# Patient Record
Sex: FEMALE | Race: WHITE | NOT HISPANIC OR LATINO | Employment: PART TIME | URBAN - METROPOLITAN AREA
[De-identification: names, ages, dates, MRNs, and addresses within clinical notes are randomized per-mention and may not be internally consistent; named-entity substitution may affect disease eponyms.]

---

## 2021-07-08 ENCOUNTER — TRANSCRIBE ORDERS (OUTPATIENT)
Dept: PHYSICAL THERAPY | Facility: CLINIC | Age: 31
End: 2021-07-08

## 2021-07-08 DIAGNOSIS — S16.1XXD STRAIN OF NECK MUSCLE, SUBSEQUENT ENCOUNTER: ICD-10-CM

## 2021-07-08 DIAGNOSIS — S39.012S LUMBAR SPINE STRAIN, SEQUELA: ICD-10-CM

## 2021-07-08 DIAGNOSIS — S22.050S COMPRESSION FRACTURE OF T5 VERTEBRA, SEQUELA: Primary | ICD-10-CM

## 2021-07-13 ENCOUNTER — TREATMENT (OUTPATIENT)
Dept: PHYSICAL THERAPY | Facility: CLINIC | Age: 31
End: 2021-07-13

## 2021-07-13 DIAGNOSIS — S16.1XXS STRAIN OF NECK MUSCLE, SEQUELA: ICD-10-CM

## 2021-07-13 DIAGNOSIS — S39.012S STRAIN OF LUMBAR REGION, SEQUELA: ICD-10-CM

## 2021-07-13 DIAGNOSIS — S22.050S COMPRESSION FRACTURE OF T5 VERTEBRA, SEQUELA: Primary | ICD-10-CM

## 2021-07-13 PROCEDURE — 97110 THERAPEUTIC EXERCISES: CPT | Performed by: PHYSICAL THERAPIST

## 2021-07-13 PROCEDURE — 97161 PT EVAL LOW COMPLEX 20 MIN: CPT | Performed by: PHYSICAL THERAPIST

## 2021-07-13 PROCEDURE — 97014 ELECTRIC STIMULATION THERAPY: CPT | Performed by: PHYSICAL THERAPIST

## 2021-07-13 PROCEDURE — 97140 MANUAL THERAPY 1/> REGIONS: CPT | Performed by: PHYSICAL THERAPIST

## 2021-07-13 NOTE — PROGRESS NOTES
Physical Therapy Initial Evaluation and Plan of Care    Patient: Daya Pinon   : 1990  Diagnosis/ICD-10 Code:  Compression fracture of T5 vertebra, sequela [S22.050S]  Referring practitioner: JENNY Genao  Date of Initial Evaluation:  Type: THERAPY  Noted: 2021  _______________________________________________________________________________________________________________________    Subjective Evaluation    History of Present Illness  Date of onset: 2021  Mechanism of injury: She was loading the truck up.  Meanwhile her boss was working on a sink and left water on the ground.  She slipped and fell onto the bumper more on the left side. Then following this (unsure of length of time in between falls) she slipped on a water hose, falling backward and caught herself with her left arm behind her and hit the ground. She went to Urgent Care that day and was diagnosed with compression fractures.  She is concerned that there may be an SI involvement.  She has a great deal of muscle spasm up to the neck.  Pain and spasm are the worst with sitting.  By the end of the day she is painful and tries to stretch her out.  Takes Flexoril 3 x day and Naproxen 500mg 2 x day.  Wakes her at night 1-2 at night.   Occasionally her left fingers will be numb.     She lifts up to 80#, generators, coolers of ice.     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   Precautions and Work Restrictions: Off workPain  Current pain ratin  At best pain ratin  At worst pain ratin  Location: Neck, back  Quality: dull ache, throbbing, discomfort, squeezing and tight (mostly  throbbing)  Relieving factors: medications, change in position, heat and ice (biofreeze patches, lying on right side with pillow under head and between knees)  Aggravating factors: sleeping, overhead activity, stairs, standing, movement, lifting, outstretched reach,  repetitive movement, prolonged positioning and ambulation (sitting)  Progression: no change (plateaued)    Social Support  Lives in: one-story house (only two steps in out of house.)  Lives with: lives with someone unsupportive.    Hand dominance: left    Diagnostic Tests  X-ray: abnormal  CT scan: abnormal    Treatments  Previous treatment: medication  Current treatment: medication  Patient Goals  Patient goals for therapy: decreased pain, improved balance, increased motion, increased strength, independence with ADLs/IADLs and return to work  Patient goal: bicycle riding           Objective          Static Posture     Head  Forward.    Cervical Spine  Tilted left.    Shoulders  Rounded.    Thoracic Spine  Hyperkyphosis.    Lumbar Spine   Decreased lordosis.     Palpation   Left   Hypertonic in the erector spinae, cervical paraspinals, levator scapulae, lumbar paraspinals, quadratus lumborum, scalenes and upper trapezius.   Tenderness of the erector spinae, levator scapulae, lumbar paraspinals, quadratus lumborum, sternocleidomastoid, suboccipitals and upper trapezius.     Right   Hypertonic in the erector spinae, cervical paraspinals, levator scapulae, lumbar paraspinals, quadratus lumborum, scalenes and upper trapezius. Tenderness of the erector spinae, lumbar paraspinals, suboccipitals and upper trapezius.     Active Range of Motion     Additional Active Range of Motion Details  Cervical AROM %  Cervical Flexion 100%  Cervical Extension 25% painful  Cervical Right Lateral Flexion 25% tight  Cervical Left Lateral Flexion 25% painful, thigh  Cervical Right Rotation 75%  Cervical Left Rotation 75%  Lumbar AROM %  Flexion 75%  Extension 75%  Right Lateral Flexion 50% pulling on left  Left Lateral Flexion 50% pain on left      Strength/Myotome Testing     Left Shoulder     Planes of Motion   Flexion: 4   Extension: 4   Abduction: 4-   External rotation at 0°: 4   Internal rotation at 0°: 4     Right Shoulder      Planes of Motion   Flexion: 4   Extension: 4   Abduction: 4-   External rotation at 0°: 4   Internal rotation at 0°: 4     Left Elbow   Flexion: 4  Extension: 4    Right Elbow   Flexion: 4  Extension: 4    Left Hip   Planes of Motion   Flexion: 4-  Extension: 4-  Abduction: 4-    Right Hip   Planes of Motion   Flexion: 4  Extension: 4  Abduction: 4    Left Knee   Flexion: 4+  Extension: 4+    Right Knee   Flexion: 4+  Extension: 4+    Left Ankle/Foot   Dorsiflexion: 4+    Right Ankle/Foot   Dorsiflexion: 4+    Tests     Lumbar     Left   Negative passive SLR.     Right   Negative passive SLR.     Left Pelvic Girdle/Sacrum   Positive: sacrum compression.     Additional Tests Details  Positive for left upslip  Positive standing flexion test  Negative sitting flexion      Subjective Questionnaire: Oswestry: 23 or 46% disability        Assessment & Plan     Assessment  Impairments: abnormal muscle tone, abnormal or restricted ROM, activity intolerance, impaired physical strength, lacks appropriate home exercise program and pain with function  Assessment details: Daya Pinon is a 31 y.o. year-old female referred to physical therapy for compression fracture of T5 and T6, cervical and lumbar strain.. She presents with a evolving clinical presentation and is currently unable to work her current job.   She represents with positive signs/sypmtoms of SI dysfunction, moderate muscular spasm, decreased cervical and lumbar strain and decreased strength.    Prognosis: good  Functional Limitations: carrying objects, lifting, sleeping, walking, pulling, pushing, uncomfortable because of pain, sitting, standing, stooping, reaching overhead and unable to perform repetitive tasks  Goals  Plan Goals: STG ( 2 weeks)   1.  Pt will be independent with initial HEP for improved mobility of cervical and lumbar spine with decreased pain.   2.  Pt will demonstrate WNL cervical ROM with little to no pain.  3.  Pt will report decreased pain  at the worst from 7/10 to 4/10.  4.  Pt will display level SI landmarks and negative SI tests.    LTG (4 weeks)  1.  Pt will be independent with cervical and lumbosacral stabilization exercises for ease with RTW duties.   2.   Pt will demonstrate WNL lumbar ROM with little to no pain.  3.  Pt will be able to lift 50# from floor to waist with little discomfort.  4.  Pt will be able to perform overhead tasks with ease.   5.  Pt will return to work.     Plan  Therapy options: will be seen for skilled physical therapy services  Planned modality interventions: cryotherapy, electrical stimulation/Russian stimulation and thermotherapy (hydrocollator packs)  Planned therapy interventions: abdominal trunk stabilization, body mechanics training, functional ROM exercises, home exercise program, neuromuscular re-education, postural training, strengthening, stretching, therapeutic activities, flexibility, manual therapy and soft tissue mobilization  Duration in visits: 12  Treatment plan discussed with: patient  Plan details: Assess SI landmarks and tests - treat as identified; Progress in UE/LE trunk flexibility, begin light strengthening, modalities PRN for pain and muscle spasm.           Manual Therapy:    15     mins  36372;  Therapeutic Exercise:    15     mins  79870;     Neuromuscular Shira:        mins  35874;    Therapeutic Activity:          mins  88915;     Gait Training:           mins  14657;     Ultrasound:          mins  72743;    Work Hardening                 mins 71996  Iontophoresis                  mins 75601  Estim   15 min    Timed Treatment:   30   mins   Total Treatment:     60   mins    PT SIGNATURE: Celeste Hoskins PT           Initial Certification  Certification Period: 7/13/2021 thru 10/11/2021  I certify that the therapy services are furnished while this patient is under my care.  The services outlined above are required by this patient, and will be reviewed every 90 days.     PHYSICIAN: Aguilar  JENNY Marcelo      DATE:     Please sign and return via fax to [phone].. Thank you, [facility].

## 2021-07-15 ENCOUNTER — TREATMENT (OUTPATIENT)
Dept: PHYSICAL THERAPY | Facility: CLINIC | Age: 31
End: 2021-07-15

## 2021-07-15 DIAGNOSIS — S39.012S STRAIN OF LUMBAR REGION, SEQUELA: ICD-10-CM

## 2021-07-15 DIAGNOSIS — S22.050S COMPRESSION FRACTURE OF T5 VERTEBRA, SEQUELA: Primary | ICD-10-CM

## 2021-07-15 DIAGNOSIS — S16.1XXS STRAIN OF NECK MUSCLE, SEQUELA: ICD-10-CM

## 2021-07-15 PROCEDURE — 97014 ELECTRIC STIMULATION THERAPY: CPT | Performed by: PHYSICAL THERAPIST

## 2021-07-15 PROCEDURE — 97110 THERAPEUTIC EXERCISES: CPT | Performed by: PHYSICAL THERAPIST

## 2021-07-15 PROCEDURE — 97140 MANUAL THERAPY 1/> REGIONS: CPT | Performed by: PHYSICAL THERAPIST

## 2021-07-15 NOTE — PROGRESS NOTES
Physical Therapy Daily Treatment Note    Patient: Daya Pinon   : 1990  Diagnosis/ICD-10 Code:  Compression fracture of T5 vertebra, sequela [S22.050S]  Referring practitioner: JENNY Genao  Date of Initial Evaluation:  Type: THERAPY  Noted: 2021  Visit # 2      Subjective Evaluation    History of Present Illness    Subjective comment: Overall pain is a 4/10.  Feels like she has more ROM and is walking with less compensation. The neck is tight, especially on the left. Still with occasional numbness in finger tips. Pain  Current pain ratin           Objective   See Exercise, Manual, and Modality Logs for complete treatment.       Assessment & Plan     Assessment  Assessment details: Good response to manual techniques.  She is walking more smoothly and with less pain.  Addressed the cervical spine more closely today.  She is tight in the suboccipital region and hypomobile at C1, C2, C3 with pain.  Response to today's treatment was even better than at initial evaluation.  She reported at end of treatment she felt she was walking normally and no radicular symptoms in her legs. Neck was much less tight.                      Manual Therapy:    30     mins  41059;  Therapeutic Exercise:    15     mins  36004;     Neuromuscular Shira:        mins  85144;    Therapeutic Activity:          mins  52717;     Gait Training:           mins  57724;     Ultrasound:          mins  46463;    Work Hardening                 mins 83448  Iontophoresis                  mins 26356  Estim   15 min    Timed Treatment:   45   mins   Total Treatment:     60   mins    Celeste Hoskins, PT  Physical Therapist

## 2021-07-20 ENCOUNTER — TREATMENT (OUTPATIENT)
Dept: PHYSICAL THERAPY | Facility: CLINIC | Age: 31
End: 2021-07-20

## 2021-07-20 DIAGNOSIS — S16.1XXS STRAIN OF NECK MUSCLE, SEQUELA: ICD-10-CM

## 2021-07-20 DIAGNOSIS — S39.012S STRAIN OF LUMBAR REGION, SEQUELA: ICD-10-CM

## 2021-07-20 DIAGNOSIS — S22.050S COMPRESSION FRACTURE OF T5 VERTEBRA, SEQUELA: Primary | ICD-10-CM

## 2021-07-20 PROCEDURE — 97014 ELECTRIC STIMULATION THERAPY: CPT | Performed by: PHYSICAL THERAPIST

## 2021-07-20 PROCEDURE — 97110 THERAPEUTIC EXERCISES: CPT | Performed by: PHYSICAL THERAPIST

## 2021-07-20 NOTE — PROGRESS NOTES
Physical Therapy Daily Treatment Note    Patient: Daya Pinon   : 1990  Diagnosis/ICD-10 Code:  Compression fracture of T5 vertebra, sequela [S22.050S]  Referring practitioner: JENNY Genao  Date of Initial Evaluation:  Type: THERAPY  Noted: 2021  Visit # 3      Subjective Evaluation    History of Present Illness    Subjective comment: Having some throbbing warm sensation in the left groin.  Some tingling in left buttock with full trunk flexionPain  Current pain rating: 3           Objective   See Exercise, Manual, and Modality Logs for complete treatment.       Assessment & Plan     Assessment  Assessment details: Good response to iliopsoas and piriformis stretching.  She had a good decrease in pain by at least 50% and her gait was normalized at end of treatment. She is continuing to have discomfort at site of compression fracture but this is also decreasing.                       Manual Therapy:         mins  63669;  Therapeutic Exercise:    30     mins  47313;     Neuromuscular Shira:        mins  36379;    Therapeutic Activity:          mins  06816;     Gait Training:           mins  48401;     Ultrasound:          mins  30156;    Work Hardening                 mins 72501  Iontophoresis                  mins 57689  Estim   15 min    Timed Treatment:   30   mins   Total Treatment:     45   mins    Celeste Hoskins, PT  Physical Therapist

## 2021-07-22 ENCOUNTER — TREATMENT (OUTPATIENT)
Dept: PHYSICAL THERAPY | Facility: CLINIC | Age: 31
End: 2021-07-22

## 2021-07-22 DIAGNOSIS — S39.012S STRAIN OF LUMBAR REGION, SEQUELA: ICD-10-CM

## 2021-07-22 DIAGNOSIS — S16.1XXS STRAIN OF NECK MUSCLE, SEQUELA: ICD-10-CM

## 2021-07-22 DIAGNOSIS — S22.050S COMPRESSION FRACTURE OF T5 VERTEBRA, SEQUELA: Primary | ICD-10-CM

## 2021-07-22 PROCEDURE — 97140 MANUAL THERAPY 1/> REGIONS: CPT | Performed by: PHYSICAL THERAPIST

## 2021-07-22 PROCEDURE — 97110 THERAPEUTIC EXERCISES: CPT | Performed by: PHYSICAL THERAPIST

## 2021-07-22 NOTE — PROGRESS NOTES
Physical Therapy Daily Treatment Note    Patient: Daya Pinon   : 1990  Diagnosis/ICD-10 Code:  Compression fracture of T5 vertebra, sequela [S22.050S]  Referring practitioner: JENNY Genao  Date of Initial Evaluation:  Type: THERAPY  Noted: 2021  Visit # 4      Subjective Evaluation    History of Present Illness    Subjective comment: Her dog got one of her shirts and pulled it under her bed. While she was bent over to retrive her shirt from her dog, she felt a pull in her back.  She is feeling more compression in her left lateral side. Pain  Current pain ratin           Objective          Tests     Additional Tests Details  Elevated left ilium in standing, sitting and supine; superior left ischial tuberosity; positive standing and to a greater degree sitting flexion test on left.       See Exercise, Manual, and Modality Logs for complete treatment.       Assessment & Plan     Assessment  Assessment details: She presented with upslip, left sacral torsion and left posterior rotation of lumbar vertebra.  She responded well to manual techniques and progression in exercises.  Her pain was 50% less and she was able to ambulate with greater ease.                      Manual Therapy:    25     mins  25253;  Therapeutic Exercise:    20     mins  47952;     Neuromuscular Shira:        mins  40333;    Therapeutic Activity:          mins  03776;     Gait Training:           mins  83030;     Ultrasound:          mins  94386;    Work Hardening                 mins 62392  Iontophoresis                  mins 04830  Estim   15 min    Timed Treatment:   45   mins   Total Treatment:     60   mins    Celeste Hoskins, YEVGENIY  Physical Therapist

## 2021-07-27 ENCOUNTER — TREATMENT (OUTPATIENT)
Dept: PHYSICAL THERAPY | Facility: CLINIC | Age: 31
End: 2021-07-27

## 2021-07-27 DIAGNOSIS — S16.1XXS STRAIN OF NECK MUSCLE, SEQUELA: ICD-10-CM

## 2021-07-27 DIAGNOSIS — S39.012S STRAIN OF LUMBAR REGION, SEQUELA: ICD-10-CM

## 2021-07-27 DIAGNOSIS — S22.050S COMPRESSION FRACTURE OF T5 VERTEBRA, SEQUELA: Primary | ICD-10-CM

## 2021-07-27 PROCEDURE — 97140 MANUAL THERAPY 1/> REGIONS: CPT | Performed by: PHYSICAL THERAPIST

## 2021-07-27 PROCEDURE — 97014 ELECTRIC STIMULATION THERAPY: CPT | Performed by: PHYSICAL THERAPIST

## 2021-07-27 PROCEDURE — 97110 THERAPEUTIC EXERCISES: CPT | Performed by: PHYSICAL THERAPIST

## 2021-07-27 NOTE — PROGRESS NOTES
Physical Therapy Daily Treatment Note    Patient: Daya Pinon   : 1990  Diagnosis/ICD-10 Code:  Compression fracture of T5 vertebra, sequela [S22.050S]  Referring practitioner: JENNY Genao  Date of Initial Evaluation:  Type: THERAPY  Noted: 2021  Visit # 5      Subjective Evaluation    History of Present Illness    Subjective comment: Feeling better.  The cristina time she notices pain is with prolonged istting and driving with her left hand.  Her pain was in the injur level.Pain  Current pain rating: 3           Objective          Tests     Additional Tests Details  Positive sitting flexion on left, deep left sacral sulcus, deep KATARZYNA on left.      See Exercise, Manual, and Modality Logs for complete treatment.       Assessment & Plan     Assessment  Assessment details: She is responding very well to SI MET and stabilization.  Increased focus on soft tissue around compression fractures to relieve pain and improve mobility.  After each session she is demonstrating improvement.                      Manual Therapy:    25     mins  44777;  Therapeutic Exercise:    20     mins  43370;     Neuromuscular Shira:        mins  60889;    Therapeutic Activity:          mins  13213;     Gait Training:           mins  77762;     Ultrasound:          mins  16903;    Work Hardening                 mins 12737  Iontophoresis                  mins 49624  Estim   15 min    Timed Treatment:   45   mins   Total Treatment:     60   mins    Celeste Hoskins, PT  Physical Therapist

## 2021-07-29 ENCOUNTER — TREATMENT (OUTPATIENT)
Dept: PHYSICAL THERAPY | Facility: CLINIC | Age: 31
End: 2021-07-29

## 2021-07-29 DIAGNOSIS — S39.012S STRAIN OF LUMBAR REGION, SEQUELA: ICD-10-CM

## 2021-07-29 DIAGNOSIS — S22.050S COMPRESSION FRACTURE OF T5 VERTEBRA, SEQUELA: Primary | ICD-10-CM

## 2021-07-29 DIAGNOSIS — S16.1XXS STRAIN OF NECK MUSCLE, SEQUELA: ICD-10-CM

## 2021-07-29 PROCEDURE — 97014 ELECTRIC STIMULATION THERAPY: CPT | Performed by: PHYSICAL THERAPIST

## 2021-07-29 PROCEDURE — 97140 MANUAL THERAPY 1/> REGIONS: CPT | Performed by: PHYSICAL THERAPIST
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
## 2021-08-03 ENCOUNTER — TREATMENT (OUTPATIENT)
Dept: PHYSICAL THERAPY | Facility: CLINIC | Age: 31
End: 2021-08-03

## 2021-08-03 DIAGNOSIS — S22.050S COMPRESSION FRACTURE OF T5 VERTEBRA, SEQUELA: Primary | ICD-10-CM

## 2021-08-03 DIAGNOSIS — S39.012S STRAIN OF LUMBAR REGION, SEQUELA: ICD-10-CM

## 2021-08-03 DIAGNOSIS — S16.1XXS STRAIN OF NECK MUSCLE, SEQUELA: ICD-10-CM

## 2021-08-03 PROCEDURE — 97110 THERAPEUTIC EXERCISES: CPT | Performed by: PHYSICAL THERAPIST

## 2021-08-03 PROCEDURE — 97140 MANUAL THERAPY 1/> REGIONS: CPT | Performed by: PHYSICAL THERAPIST

## 2021-08-03 NOTE — PROGRESS NOTES
Physical Therapy Daily Treatment Note    Patient: Daya Pinon   : 1990  Diagnosis/ICD-10 Code:  Compression fracture of T5 vertebra, sequela [S22.050S]  Referring practitioner: JENNY Genao  Date of Initial Evaluation:  Type: THERAPY  Noted: 2021  Visit # 7      Subjective Evaluation    History of Present Illness    Subjective comment: The right quadrant feels off.  Feels like her left ilium may be up somePain  Current pain rating: 3           Objective   See Exercise, Manual, and Modality Logs for complete treatment.       Assessment & Plan     Assessment  Assessment details: She presents with moderate crepitus in the left UT and levator.  Progressed her with lengthening exercises to decrease strain on left side from upslip.  She responded positively with level landmarks in ilium and fluid movement in the left shoulder and scapular after manual techniques.                       Manual Therapy:    30     mins  40941;  Therapeutic Exercise:    10     mins  04986;     Neuromuscular Shira:        mins  60758;    Therapeutic Activity:          mins  63979;     Gait Training:           mins  41741;     Ultrasound:          mins  50120;    Work Hardening                 mins 54362  Iontophoresis                  mins 23915    Timed Treatment:   40   mins   Total Treatment:     40   mins    Celeste Hoskins, PT  Physical Therapist

## 2021-08-05 ENCOUNTER — TREATMENT (OUTPATIENT)
Dept: PHYSICAL THERAPY | Facility: CLINIC | Age: 31
End: 2021-08-05

## 2021-08-05 DIAGNOSIS — S22.050S COMPRESSION FRACTURE OF T5 VERTEBRA, SEQUELA: Primary | ICD-10-CM

## 2021-08-05 DIAGNOSIS — S39.012S STRAIN OF LUMBAR REGION, SEQUELA: ICD-10-CM

## 2021-08-05 DIAGNOSIS — S16.1XXS STRAIN OF NECK MUSCLE, SEQUELA: ICD-10-CM

## 2021-08-05 PROCEDURE — 97014 ELECTRIC STIMULATION THERAPY: CPT | Performed by: PHYSICAL THERAPIST

## 2021-08-05 PROCEDURE — 97140 MANUAL THERAPY 1/> REGIONS: CPT | Performed by: PHYSICAL THERAPIST

## 2021-08-05 PROCEDURE — 97110 THERAPEUTIC EXERCISES: CPT | Performed by: PHYSICAL THERAPIST

## 2021-08-05 NOTE — PROGRESS NOTES
Physical Therapy Daily Treatment Note    Patient: Daya Pinon   : 1990  Diagnosis/ICD-10 Code:  Compression fracture of T5 vertebra, sequela [S22.050S]  Referring practitioner: JENNY Genao  Date of Initial Evaluation:  Type: THERAPY  Noted: 2021  Visit # 8      Subjective Evaluation    History of Present Illness    Subjective comment: Overall continuing to improve       Objective   See Exercise, Manual, and Modality Logs for complete treatment.       Assessment & Plan     Assessment  Assessment details: Progressed her with upper trunk strengthening and foam roll exercises today.  She did well with these activities and had no increase in pain.  Also focused majority of manual work on left ribs, vertebra and paraspinals to decrease strain from previous imbalance.                      Manual Therapy:    15    mins  56803;  Therapeutic Exercise:    20     mins  93123;     Neuromuscular Shira:        mins  28415;    Therapeutic Activity:          mins  75940;     Gait Training:           mins  99138;     Ultrasound:          mins  18454;    Work Hardening                 mins 76911  Iontophoresis                  mins 50061  Estim   15 min    Timed Treatment:   35   mins   Total Treatment:     50   mins    Celeste Hoskins, PT  Physical Therapist

## 2021-08-10 ENCOUNTER — TREATMENT (OUTPATIENT)
Dept: PHYSICAL THERAPY | Facility: CLINIC | Age: 31
End: 2021-08-10

## 2021-08-10 DIAGNOSIS — S22.050S COMPRESSION FRACTURE OF T5 VERTEBRA, SEQUELA: Primary | ICD-10-CM

## 2021-08-10 DIAGNOSIS — S16.1XXS STRAIN OF NECK MUSCLE, SEQUELA: ICD-10-CM

## 2021-08-10 DIAGNOSIS — S39.012S STRAIN OF LUMBAR REGION, SEQUELA: ICD-10-CM

## 2021-08-10 PROCEDURE — 97014 ELECTRIC STIMULATION THERAPY: CPT | Performed by: PHYSICAL THERAPIST

## 2021-08-10 PROCEDURE — 97140 MANUAL THERAPY 1/> REGIONS: CPT | Performed by: PHYSICAL THERAPIST

## 2021-08-10 NOTE — PROGRESS NOTES
Physical Therapy Daily Treatment Note    Patient: Daya Pinon   : 1990  Diagnosis/ICD-10 Code:  Compression fracture of T5 vertebra, sequela [S22.050S]  Referring practitioner: JENNY Genao  Date of Initial Evaluation:  Type: THERAPY  Noted: 2021  Visit # 9      Subjective Evaluation    History of Present Illness    Subjective comment: Overall better but still has an area of pain on the left from the lower ribs to the top of the left ilium.  This pain can go up to a 4/10.  She is being released to work on 2021. The MD wanted her to finish her PT until she returns to work.Pain  Current pain ratin           Objective   See Exercise, Manual, and Modality Logs for complete treatment.       Assessment & Plan     Assessment  Assessment details: She is still presenting with slight upslip but this is decreasing with every treatment. She is experiencing some irritation from the upslip in her left rib cage and ilium.  Focus today on relieving this pain and irritation.  Educated on right sidelying stretching to elongate the left side and minimize symptoms.                       Manual Therapy:    25     mins  11373;  Therapeutic Exercise:         mins  38812;     Neuromuscular Shira:        mins  87450;    Therapeutic Activity:          mins  94407;     Gait Training:           mins  94555;     Ultrasound:          mins  58018;    Work Hardening                 mins 71840  Iontophoresis                  mins 28887  Estim   15 min    Timed Treatment:   25   mins   Total Treatment:     40   mins    Celeste Hoskins, PT  Physical Therapist

## 2021-08-12 ENCOUNTER — TREATMENT (OUTPATIENT)
Dept: PHYSICAL THERAPY | Facility: CLINIC | Age: 31
End: 2021-08-12

## 2021-08-12 DIAGNOSIS — S16.1XXS STRAIN OF NECK MUSCLE, SEQUELA: ICD-10-CM

## 2021-08-12 DIAGNOSIS — S22.050S COMPRESSION FRACTURE OF T5 VERTEBRA, SEQUELA: Primary | ICD-10-CM

## 2021-08-12 DIAGNOSIS — S39.012S STRAIN OF LUMBAR REGION, SEQUELA: ICD-10-CM

## 2021-08-12 PROCEDURE — 97035 APP MDLTY 1+ULTRASOUND EA 15: CPT | Performed by: PHYSICAL THERAPIST

## 2021-08-12 PROCEDURE — 97140 MANUAL THERAPY 1/> REGIONS: CPT | Performed by: PHYSICAL THERAPIST

## 2021-08-12 PROCEDURE — 97014 ELECTRIC STIMULATION THERAPY: CPT | Performed by: PHYSICAL THERAPIST

## 2021-08-12 NOTE — PROGRESS NOTES
Re-Assessment / Re-Certification        Patient: Daya Pinon   : 1990  Diagnosis/ICD-10 Code:  Compression fracture of T5 vertebra, sequela [S22.050S]  Referring practitioner: JENNY Genao  Date of Initial Evaluation:  Type: THERAPY  Noted: 2021  Patient seen for 10 sessions      Subjective:   Daya Pinon reports: Continuing to improve but still with tightness and pain on the left  Subjective Questionnaire: Oswestry: 13 or 16% disabilityClinical Progress: improved  Home Program Compliance: Yes  Treatment has included: therapeutic exercise, neuromuscular re-education, manual therapy, therapeutic activity, electrical stimulation, moist heat and pt education    Subjective Evaluation    History of Present Illness    Subjective comment: Has a dull ache in left low back to left SI.  Can't find a stretch that will reach the area. Pain  Current pain rating: 3  At best pain ratin (Occasionally)  At worst pain ratin (when she bends over and comes back up too quickly)  Location: spine         Objective          Active Range of Motion     Additional Active Range of Motion Details  Lumbar AROM %  Flexion 100%  Extension 75%  Right Lateral Flexion 75%  Left Lateral Flexion 75%      Tests     Lumbar     Left   Negative passive SLR.     Right   Negative passive SLR.     Left Pelvic Girdle/Sacrum   Positive: sacrum compression.     Additional Tests Details  Standing flexion positive on the left  Sacral sign      Assessment & Plan     Assessment  Assessment details: Daya Pinon has been seen for 10 physical therapy sessions for compression fracture of T5 and T6, cervical and lumbar strain. She is demonstrating good functional and objective gains.  Her cervical region is WNL.  She still displays mild SI dysfunction but is being progressed with SI stabilization exercises.  She will benefit from continued skilled physical therapy to address remaining impairments and functional limitations.   Prognosis:  good    Goals  Plan Goals: STG ( 2 weeks)   1.  Pt will be independent with initial HEP for improved mobility of cervical and lumbar spine with decreased pain. MET  2.  Pt will demonstrate WNL cervical ROM with little to no pain. MET  3.  Pt will report decreased pain at the worst from 7/10 to 4/10. NOT MET  4.  Pt will display level SI landmarks and negative SI tests. NOT MET    LTG (4 weeks)  1.  Pt will be independent with cervical and lumbosacral stabilization exercises for ease with RTW duties. PARTIALLY MET  2.   Pt will demonstrate WNL lumbar ROM with little to no pain. NOT MET  3.  Pt will be able to lift 50# from floor to waist with little discomfort. NOT ASSESSED  4.  Pt will be able to perform overhead tasks with ease. NOT ASSESSED  5.  Pt will return to work. NOT MET      Progress toward previous goals: Continue until August 23, 2021 when she gets released to return to work.      Manual Therapy:    25     mins  13214;  Therapeutic Exercise:         mins  63780;     Neuromuscular Shira:        mins  63937;    Therapeutic Activity:          mins  99970;     Gait Training:           mins  43444;     Ultrasound:     10     mins  29936;    Work Hardening                 mins 30364  Iontophoresis                  mins 60424  Estim   15 min    Timed Treatment:   35   mins   Total Treatment:     50   mins

## 2021-08-17 ENCOUNTER — TREATMENT (OUTPATIENT)
Dept: PHYSICAL THERAPY | Facility: CLINIC | Age: 31
End: 2021-08-17

## 2021-08-17 DIAGNOSIS — S16.1XXS STRAIN OF NECK MUSCLE, SEQUELA: ICD-10-CM

## 2021-08-17 DIAGNOSIS — S22.050S COMPRESSION FRACTURE OF T5 VERTEBRA, SEQUELA: Primary | ICD-10-CM

## 2021-08-17 DIAGNOSIS — S39.012S STRAIN OF LUMBAR REGION, SEQUELA: ICD-10-CM

## 2021-08-17 PROCEDURE — 97035 APP MDLTY 1+ULTRASOUND EA 15: CPT | Performed by: PHYSICAL THERAPIST

## 2021-08-17 PROCEDURE — 97140 MANUAL THERAPY 1/> REGIONS: CPT | Performed by: PHYSICAL THERAPIST

## 2021-08-17 NOTE — PROGRESS NOTES
Physical Therapy Daily Treatment Note    Patient: Daya Pinon   : 1990  Diagnosis/ICD-10 Code:  Compression fracture of T5 vertebra, sequela [S22.050S]  Referring practitioner: JENNY Genao  Date of Initial Evaluation:  Type: THERAPY  Noted: 2021  Visit # 11      Subjective Evaluation    History of Present Illness    Subjective comment: Feeling better.  Her pain is low.  Having some discomfort in the left iliac crest region.Pain  Current pain ratin           Objective   See Exercise, Manual, and Modality Logs for complete treatment.       Assessment & Plan     Assessment  Assessment details: She is progressively getting better and her areas of tenderness are diminishing.  Anticipate seeing her 1-2 more sessions then DC                     Manual Therapy:   20    mins  80112;  Therapeutic Exercise:         mins  66808;     Neuromuscular Shira:        mins  38385;    Therapeutic Activity:          mins  45201;     Gait Training:           mins  45016;     Ultrasound:     10     mins  56772;    Work Hardening                 mins 41305  Iontophoresis                  mins 27474    Timed Treatment:   30   mins   Total Treatment:     30   mins    Celeste Hoskins, PT  Physical Therapist

## 2021-08-20 ENCOUNTER — TREATMENT (OUTPATIENT)
Dept: PHYSICAL THERAPY | Facility: CLINIC | Age: 31
End: 2021-08-20

## 2021-08-20 DIAGNOSIS — S22.050S COMPRESSION FRACTURE OF T5 VERTEBRA, SEQUELA: Primary | ICD-10-CM

## 2021-08-20 DIAGNOSIS — S16.1XXS STRAIN OF NECK MUSCLE, SEQUELA: ICD-10-CM

## 2021-08-20 DIAGNOSIS — S39.012S STRAIN OF LUMBAR REGION, SEQUELA: ICD-10-CM

## 2021-08-20 PROCEDURE — 97140 MANUAL THERAPY 1/> REGIONS: CPT | Performed by: PHYSICAL THERAPIST

## 2021-08-20 PROCEDURE — 97014 ELECTRIC STIMULATION THERAPY: CPT | Performed by: PHYSICAL THERAPIST

## 2021-08-20 PROCEDURE — 97035 APP MDLTY 1+ULTRASOUND EA 15: CPT | Performed by: PHYSICAL THERAPIST

## 2021-08-20 NOTE — PROGRESS NOTES
Physical Therapy Daily Treatment Note     Discharge Summary    Patient: Daya Pinon   : 1990  Diagnosis/ICD-10 Code:  Compression fracture of T5 vertebra, sequela [S22.050S]  Referring practitioner: JENNY Genao  Date of Initial Evaluation:  Type: THERAPY  Noted: 2021  Visit # 12      Subjective Evaluation    History of Present Illness    Subjective comment: The pain is situational and is still primarily in the left iliac crest to lower rib cage region. Pain  Current pain ratin      Subjective Questionnaire: Oswestry: 13 or 16% disability (taken on 21)      Objective   See Exercise, Manual, and Modality Logs for complete treatment.       Assessment & Plan     Assessment  Assessment details: Daya Pinon was seen for 12 physical therapy sessions for compression fracture T5, cervical and lumbar strain .  Treatment included therapeutic exercise, manual therapy, therapeutic activity, neuro-muscular retraining , ultrasound, electrical stimulation , patient education with home exercise program  and pt education. Progress to physical therapy goals was good. 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 She was discharged to an independent HEP and provided patient education to self-manage condition.     Goals  Plan Goals: STG ( 2 weeks)   1.  Pt will be independent with initial HEP for improved mobility of cervical and lumbar spine with decreased pain. MET  2.  Pt will demonstrate WNL cervical ROM with little to no pain. MET  3.  Pt will report decreased pain at the worst from 7/10 to 4/10. MET  4.  Pt will display level SI landmarks and negative SI tests. MET    LTG (4 weeks)  1.  Pt will be independent with cervical and lumbosacral stabilization exercises for ease with RTW duties.  MET  2.   Pt will demonstrate WNL lumbar ROM with little to no pain.  MET  3.  Pt will be able to lift 50# from floor to waist with little discomfort. NOT ASSESSED.  She is  not returning to her previous job  4.  Pt will be able to perform overhead tasks with ease. MET  5.  Pt will return to work. MET She has found a different job and will start that next week.  Will not be returning to the job she initially had.              Manual Therapy:    25     mins  17980;  Therapeutic Exercise:         mins  12837;     Neuromuscular Shira:        mins  58375;    Therapeutic Activity:          mins  46094;     Gait Training:           mins  35897;     Ultrasound:     10     mins  80897;    Work Hardening                 mins 80608  Iontophoresis                  mins 00228  Estim   15 min    Timed Treatment:   35   mins   Total Treatment:     50   mins    Celeste Hoskins, PT  Physical Therapist